# Patient Record
Sex: MALE | URBAN - METROPOLITAN AREA
[De-identification: names, ages, dates, MRNs, and addresses within clinical notes are randomized per-mention and may not be internally consistent; named-entity substitution may affect disease eponyms.]

---

## 2021-06-29 ENCOUNTER — APPOINTMENT (OUTPATIENT)
Dept: URBAN - METROPOLITAN AREA CLINIC 256 | Age: 21
Setting detail: DERMATOLOGY
End: 2021-06-29

## 2021-06-29 VITALS — WEIGHT: 150 LBS | HEIGHT: 67 IN | RESPIRATION RATE: 16 BRPM

## 2021-06-29 DIAGNOSIS — D69.0 ALLERGIC PURPURA: ICD-10-CM

## 2021-06-29 PROCEDURE — 99204 OFFICE O/P NEW MOD 45 MIN: CPT

## 2021-06-29 PROCEDURE — OTHER COUNSELING: OTHER

## 2021-06-29 PROCEDURE — OTHER PRESCRIPTION: OTHER

## 2021-06-29 PROCEDURE — OTHER PRESCRIPTION MEDICATION MANAGEMENT: OTHER

## 2021-06-29 RX ORDER — PREDNISONE 20 MG/1
TABLET ORAL TID
Qty: 30 | Refills: 0 | Status: ERX | COMMUNITY
Start: 2021-06-29

## 2021-06-29 ASSESSMENT — LOCATION DETAILED DESCRIPTION DERM
LOCATION DETAILED: LEFT PROXIMAL PRETIBIAL REGION
LOCATION DETAILED: RIGHT PROXIMAL PRETIBIAL REGION

## 2021-06-29 ASSESSMENT — LOCATION SIMPLE DESCRIPTION DERM
LOCATION SIMPLE: RIGHT PRETIBIAL REGION
LOCATION SIMPLE: LEFT PRETIBIAL REGION

## 2021-06-29 ASSESSMENT — LOCATION ZONE DERM: LOCATION ZONE: LEG

## 2021-06-29 NOTE — PROCEDURE: PRESCRIPTION MEDICATION MANAGEMENT
Initiate Treatment: Prednisone 20mg TID and ibuprofen
Detail Level: Zone
Render In Strict Bullet Format?: No
Continue Regimen: Hydromorphine

## 2021-06-29 NOTE — PROCEDURE: COUNSELING
Detail Level: Generalized
Patient Specific Counseling (Will Not Stick From Patient To Patient): Reviewed office visit notes from the ER last night. Advised not applying steroid cream as it will not improve the rash. Encouraged patient to discontinue vaping.

## 2021-06-29 NOTE — HPI: RASH
What Type Of Note Output Would You Prefer (Optional)?: Standard Output
How Severe Is Your Rash?: severe
Is This A New Presentation, Or A Follow-Up?: Rash
Additional History: Patient has a history of HSP in the past. He went to the ER last night and had lab work done and was given oral prednisone. Office visit notes are printed out for review today.

## 2021-07-02 ENCOUNTER — APPOINTMENT (OUTPATIENT)
Dept: URBAN - METROPOLITAN AREA CLINIC 256 | Age: 21
Setting detail: DERMATOLOGY
End: 2021-07-02

## 2021-07-02 VITALS — HEIGHT: 67 IN | WEIGHT: 315 LBS | RESPIRATION RATE: 14 BRPM

## 2021-07-02 DIAGNOSIS — D69.0 ALLERGIC PURPURA: ICD-10-CM

## 2021-07-02 PROCEDURE — OTHER COUNSELING: OTHER

## 2021-07-02 PROCEDURE — 99214 OFFICE O/P EST MOD 30 MIN: CPT

## 2021-07-02 PROCEDURE — OTHER PRESCRIPTION: OTHER

## 2021-07-02 PROCEDURE — OTHER ADDITIONAL NOTES: OTHER

## 2021-07-02 RX ORDER — HYDROCODONE BITARTRATE AND ACETAMINOPHEN 10; 300 MG/1; MG/1
TABLET ORAL TID
Qty: 12 | Refills: 0 | COMMUNITY
Start: 2021-07-02

## 2021-07-02 ASSESSMENT — LOCATION SIMPLE DESCRIPTION DERM
LOCATION SIMPLE: LEFT PRETIBIAL REGION
LOCATION SIMPLE: RIGHT PRETIBIAL REGION

## 2021-07-02 ASSESSMENT — LOCATION ZONE DERM: LOCATION ZONE: LEG

## 2021-07-02 ASSESSMENT — LOCATION DETAILED DESCRIPTION DERM
LOCATION DETAILED: RIGHT PROXIMAL PRETIBIAL REGION
LOCATION DETAILED: LEFT PROXIMAL PRETIBIAL REGION

## 2024-04-10 NOTE — PROCEDURE: ADDITIONAL NOTES
Additional Notes: Patient states he has blood in his stool and nose bleeds, no blood in his urine. Patient walked in today, last visit he was in a wheel chair. \\n\\nLanced a few of his blisters.
Detail Level: Detailed
Render Risk Assessment In Note?: no
yes